# Patient Record
(demographics unavailable — no encounter records)

---

## 2025-03-12 NOTE — HISTORY OF PRESENT ILLNESS
[de-identified] : Mr. Perdomo presented previously for evaluation and management of a right inguinal hernia.  He stated the hernia has been present for approximately 3 years.  He first noticed the hernia as right groin pain, which is most present with activity/movement or coughing.  He noticed a bulge in the groin.  He stated the hernia is enlarging.  He stated he has had ongoing pulmonary symptoms from persistent pneumonia.  He underwent a robotic-assisted bilateral inguinal hernia repair with mesh and umbilical hernia repair on March 28, 2024. [de-identified] : He presented today for a follow up visit.  He is overall feeling well.  He denied fever, chills, nausea, vomiting, diarrhea, or constipation.  He stated he has pain and "weakness" of the right groin.  He stated he was traveling recently and he was doing some heavy lifting, which worsened the symptoms.

## 2025-03-12 NOTE — CONSULT LETTER
[FreeTextEntry1] : March 10, 2025     Clay Kim M.D. [address] [facility], [address] [place] [address] Telephone #: [phone]   Re: Mina Perdomo : 1958   Dear Dr. Kim:  I had the opportunity to see Mr. Perdomo today for a follow up visit after he underwent a robotic-assisted bilateral inguinal hernia repair with mesh and umbilical hernia repair on 2024. He is overall feeling well.  He denied fever, chills, nausea, vomiting, diarrhea, or constipation.  He stated he has pain and "weakness" of the right groin.  He stated he was traveling recently and he was doing some heavy lifting, which worsened the symptoms.  On physical examination, he is a well-dressed well-nourished man who appears his stated age and is in no acute distress. He is calm, alert and oriented x3. HEENT exam demonstrates a normocephalic atraumatic appearance with no scleral icterus. His abdomen is soft, non-tender, and non-distended. The incisions are well-healed without evidence of a recurrent umbilical hernia or any incisional hernias with Valsalva maneuver. His extremities are warm and dry without clubbing, cyanosis or edema. Bilateral groin examination demonstrates no definite evidence of a recurrent inguinal hernia bilaterally with Valsalva maneuver.  In summary, Mr. Perdomo is a 66-year-old man who underwent a robotic-assisted bilateral inguinal hernia repair with mesh and umbilical hernia repair on 2024, and now notes right-sided groin pain and "weakness" without a hernia appreciated on examination.  We will perform an ultrasound of the right groin to evaluate for a recurrent hernia.  If the ultrasound is non-diagnostic, we will proceed with a CT abdomen/pelvis.  He was also referred to urology and gastroenterology for his complaints of nocturia and fecal urgency.  Thank you for the opportunity to care for this patient. Please do not hesitate to contact me in the event that you have any questions or concerns about the care of this patient.  Sincerely,    Kimberly Gamez M.D.  CC: Sandoval Fisher M.D. [address] [place] [id number] Telephone #: [phone]  Carli Jean Baptiste M.D. [address] [place] [address] 3[address] [place] [address] Telephone #: [phone]

## 2025-03-12 NOTE — PHYSICAL EXAM
[Calm] : calm [de-identified] : NAD, comfortable  [de-identified] : NCAT, no scleral icterus  [de-identified] : soft NT ND.  Incisions well-healed without evidence of a recurrent umbilical hernia or any incisional hernias on Valsalva maneuver.  [de-identified] : Bilateral groin examination demonstrates no definite evidence of a recurrent inguinal hernia bilaterally with Valsalva maneuver.  [de-identified] : No clubbing, cyanosis, or edema.  [de-identified] : Warm, dry.  [de-identified] : A&Ox3

## 2025-03-12 NOTE — PHYSICAL EXAM
[Calm] : calm [de-identified] : NAD, comfortable  [de-identified] : NCAT, no scleral icterus  [de-identified] : soft NT ND.  Incisions well-healed without evidence of a recurrent umbilical hernia or any incisional hernias on Valsalva maneuver.  [de-identified] : Bilateral groin examination demonstrates no definite evidence of a recurrent inguinal hernia bilaterally with Valsalva maneuver.  [de-identified] : No clubbing, cyanosis, or edema.  [de-identified] : Warm, dry.  [de-identified] : A&Ox3

## 2025-03-12 NOTE — DATA REVIEWED
[FreeTextEntry1] : CT abdomen/pelvis (4/3/2023) - no evidence of ventral hernia or other mass in the anterior abdominal wall in area of concern. No other significant pathology identified in the abdomen or pelvis.  US groin (10/19/2023) - small fat filled right femoral hernia.  Mild posterior inguinal wall deficiency.

## 2025-03-12 NOTE — ASSESSMENT
[FreeTextEntry1] : Mr. Perdomo is a 66-year-old man who underwent a robotic-assisted bilateral inguinal hernia repair with mesh and umbilical hernia repair on March 28, 2024, and now notes right-sided groin pain and "weakness" without a hernia appreciated on examination.  We will perform an ultrasound of the right groin to evaluate for a recurrent hernia.  If the ultrasound is non-diagnostic, we will proceed with a CT abdomen/pelvis.  He was also referred to urology and gastroenterology for his complaints of nocturia and fecal urgency.

## 2025-03-12 NOTE — REASON FOR VISIT
[Follow-Up: _____] : a [unfilled] follow-up visit [FreeTextEntry1] : He underwent a robotic-assisted bilateral inguinal hernia repair with mesh and umbilical hernia repair on March 28, 2024. [FreeTextEntry2] : 3/28/2024

## 2025-03-12 NOTE — CONSULT LETTER
[FreeTextEntry1] : March 10, 2025     Clay Kim M.D. [address] [facility], [address] [place] [address] Telephone #: [phone]   Re: Mina Perdomo : 1958   Dear Dr. Kim:  I had the opportunity to see Mr. Perdomo today for a follow up visit after he underwent a robotic-assisted bilateral inguinal hernia repair with mesh and umbilical hernia repair on 2024. He is overall feeling well.  He denied fever, chills, nausea, vomiting, diarrhea, or constipation.  He stated he has pain and "weakness" of the right groin.  He stated he was traveling recently and he was doing some heavy lifting, which worsened the symptoms.  On physical examination, he is a well-dressed well-nourished man who appears his stated age and is in no acute distress. He is calm, alert and oriented x3. HEENT exam demonstrates a normocephalic atraumatic appearance with no scleral icterus. His abdomen is soft, non-tender, and non-distended. The incisions are well-healed without evidence of a recurrent umbilical hernia or any incisional hernias with Valsalva maneuver. His extremities are warm and dry without clubbing, cyanosis or edema. Bilateral groin examination demonstrates no definite evidence of a recurrent inguinal hernia bilaterally with Valsalva maneuver.  In summary, Mr. Perdomo is a 66-year-old man who underwent a robotic-assisted bilateral inguinal hernia repair with mesh and umbilical hernia repair on 2024, and now notes right-sided groin pain and "weakness" without a hernia appreciated on examination.  We will perform an ultrasound of the right groin to evaluate for a recurrent hernia.  If the ultrasound is non-diagnostic, we will proceed with a CT abdomen/pelvis.  He was also referred to urology and gastroenterology for his complaints of nocturia and fecal urgency.  Thank you for the opportunity to care for this patient. Please do not hesitate to contact me in the event that you have any questions or concerns about the care of this patient.  Sincerely,    Kimberly Gamez M.D.  CC: Sandoval Fisher M.D. [address] [place] [id number] Telephone #: [phone]  Carli Jean Baptiste M.D. [address] [place] [address] 3rd [place] [address] Telephone #: [phone]

## 2025-03-12 NOTE — HISTORY OF PRESENT ILLNESS
[de-identified] : Mr. Perdomo presented previously for evaluation and management of a right inguinal hernia.  He stated the hernia has been present for approximately 3 years.  He first noticed the hernia as right groin pain, which is most present with activity/movement or coughing.  He noticed a bulge in the groin.  He stated the hernia is enlarging.  He stated he has had ongoing pulmonary symptoms from persistent pneumonia.  He underwent a robotic-assisted bilateral inguinal hernia repair with mesh and umbilical hernia repair on March 28, 2024. [de-identified] : He presented today for a follow up visit.  He is overall feeling well.  He denied fever, chills, nausea, vomiting, diarrhea, or constipation.  He stated he has pain and "weakness" of the right groin.  He stated he was traveling recently and he was doing some heavy lifting, which worsened the symptoms.

## 2025-07-07 NOTE — REVIEW OF SYSTEMS
[Negative] : Heme/Lymph [FreeTextEntry7] : hx AIDS 1999 [FreeTextEntry8] : as per HPI [de-identified] : pre diabetes

## 2025-07-07 NOTE — PHYSICAL EXAM
[Normal] : oriented to person, place and time, with appropriate affect [de-identified] : anicteric [de-identified] : S1,S2, regular rate and rhythm. No murmurs heard.  [de-identified] : Clear throughout. No wheezes heard.  [de-identified] : warm and dry

## 2025-07-07 NOTE — HISTORY OF PRESENT ILLNESS
[de-identified] : Patient Name: LUANA BAGLEY MRN: [id number] Referring Provider: LILIA DELONG,AYLIN MILLER Date: 07/07/2025   66 year old male presents for evaluation of pancreas duct dilation  3/28/2024 - Patient underwent a bilateral inguinal hernia repair with mesh and umbilical hernia repair (Dr. Gamez). Post op patient was having right sided groin pain and weakness. CT was ordered due to no evidence of recurrence on physical exam. 4/8/2025 - CT A/P showing no recurrent hernia. Incidental finding of pancreatic duct dilatation. Pancreas: Diffuse mild-moderate smooth dilatation of main pancreatic duct throughout head including near ampulla, neck, and proximal body 6 mm; no evidence of dilatation at distal body and tail. No parenchymal atrophy.   Currently, Mr. BAGLEY continues to have right sided lower abdominal discomfort, pain is a constant and varies in severity and can make it difficult for him to walk. He denies changes to bowel habits, or appetite. He denies dysuria. No known family history of pancreas cancer.  Last colonoscopy was 2022 - reportedly normal. However, he has a history of pre-cancerous colon polyps prior to this colonoscopy which were removed.   Functional Status: Mr. BAGLEY is able to walk 5 blocks without fatigue or dyspnea.

## 2025-07-07 NOTE — REVIEW OF SYSTEMS
[Negative] : Heme/Lymph [FreeTextEntry7] : hx AIDS 1999 [FreeTextEntry8] : as per HPI [de-identified] : pre diabetes

## 2025-07-07 NOTE — PHYSICAL EXAM
[Normal] : oriented to person, place and time, with appropriate affect [de-identified] : anicteric [de-identified] : S1,S2, regular rate and rhythm. No murmurs heard.  [de-identified] : Clear throughout. No wheezes heard.  [de-identified] : warm and dry

## 2025-07-07 NOTE — HISTORY OF PRESENT ILLNESS
[de-identified] : Patient Name: LUANA BAGLEY MRN: [id number] Referring Provider: LILIA DELONG,AYLIN MILLER Date: 07/07/2025   66 year old male presents for evaluation of pancreas duct dilation  3/28/2024 - Patient underwent a bilateral inguinal hernia repair with mesh and umbilical hernia repair (Dr. Gamez). Post op patient was having right sided groin pain and weakness. CT was ordered due to no evidence of recurrence on physical exam. 4/8/2025 - CT A/P showing no recurrent hernia. Incidental finding of pancreatic duct dilatation. Pancreas: Diffuse mild-moderate smooth dilatation of main pancreatic duct throughout head including near ampulla, neck, and proximal body 6 mm; no evidence of dilatation at distal body and tail. No parenchymal atrophy.   Currently, Mr. BAGLEY continues to have right sided lower abdominal discomfort, pain is a constant and varies in severity and can make it difficult for him to walk. He denies changes to bowel habits, or appetite. He denies dysuria. No known family history of pancreas cancer.  Last colonoscopy was 2022 - reportedly normal. However, he has a history of pre-cancerous colon polyps prior to this colonoscopy which were removed.   Functional Status: Mr. BAGLEY is able to walk 5 blocks without fatigue or dyspnea.

## 2025-07-07 NOTE — PHYSICAL EXAM
[Normal] : oriented to person, place and time, with appropriate affect [de-identified] : anicteric [de-identified] : S1,S2, regular rate and rhythm. No murmurs heard.  [de-identified] : Clear throughout. No wheezes heard.  [de-identified] : warm and dry

## 2025-07-07 NOTE — ASSESSMENT
[FreeTextEntry1] : Mr. Perdomo is a 66-year-old gentleman referred by Dr. Gamez with an incidental finding of a dilated pancreatic duct.  The patient had a CT of the abdomen and pelvis on April 8, 2025 which was obtained to rule out the recurrent inguinal hernia.  On the CT the pancreas showed smooth dilation of the main pancreatic duct throughout the head down to the ampulla and measured about 6 mm.  There was no evidence of dilation at distal body or tail.   On my review of the CT, I am not impressed by the findings.  I also do not see any other pancreatic lesions such as IPMN or hypo-enhancing masses.   I would like to clarify the CT findings with an MRI/MRCP of the pancreas.   I anticipate the study will be negative, and in that case we may decide to follow-up the patient with another MRI in 6 months as an excess of precaution.   In case of any questionable findings on MRI, we will obtain an endoscopic ultrasound. I have discussed with Dr. Gamez,

## 2025-07-07 NOTE — HISTORY OF PRESENT ILLNESS
[de-identified] : Patient Name: LUANA BAGLEY MRN: [id number] Referring Provider: LILIA DELONG,AYLIN MILLER Date: 07/07/2025   66 year old male presents for evaluation of pancreas duct dilation  3/28/2024 - Patient underwent a bilateral inguinal hernia repair with mesh and umbilical hernia repair (Dr. Gamez). Post op patient was having right sided groin pain and weakness. CT was ordered due to no evidence of recurrence on physical exam. 4/8/2025 - CT A/P showing no recurrent hernia. Incidental finding of pancreatic duct dilatation. Pancreas: Diffuse mild-moderate smooth dilatation of main pancreatic duct throughout head including near ampulla, neck, and proximal body 6 mm; no evidence of dilatation at distal body and tail. No parenchymal atrophy.   Currently, Mr. BAGLEY continues to have right sided lower abdominal discomfort, pain is a constant and varies in severity and can make it difficult for him to walk. He denies changes to bowel habits, or appetite. He denies dysuria. No known family history of pancreas cancer.  Last colonoscopy was 2022 - reportedly normal. However, he has a history of pre-cancerous colon polyps prior to this colonoscopy which were removed.   Functional Status: Mr. BAGLEY is able to walk 5 blocks without fatigue or dyspnea.

## 2025-07-07 NOTE — REVIEW OF SYSTEMS
[Negative] : Heme/Lymph [FreeTextEntry7] : hx AIDS 1999 [FreeTextEntry8] : as per HPI [de-identified] : pre diabetes